# Patient Record
Sex: MALE | Race: WHITE
[De-identification: names, ages, dates, MRNs, and addresses within clinical notes are randomized per-mention and may not be internally consistent; named-entity substitution may affect disease eponyms.]

---

## 2018-09-17 ENCOUNTER — HOSPITAL ENCOUNTER (OUTPATIENT)
Dept: HOSPITAL 53 - M SMT | Age: 68
End: 2018-09-17
Attending: NURSE PRACTITIONER
Payer: MEDICARE

## 2018-09-17 DIAGNOSIS — N21.0: Primary | ICD-10-CM

## 2018-09-17 DIAGNOSIS — N13.30: Primary | ICD-10-CM

## 2018-09-17 DIAGNOSIS — N21.0: ICD-10-CM

## 2018-09-17 DIAGNOSIS — Z12.5: ICD-10-CM

## 2018-09-17 LAB
AMORPH SED URNS QL MICRO: (no result)
ANION GAP: 11 MEQ/L (ref 8–16)
APPEARANCE, URINE: (no result)
BACTERIA UR QL AUTO: NEGATIVE
BILIRUBIN, URINE AUTO: NEGATIVE
BLOOD UREA NITROGEN: 27 MG/DL (ref 7–18)
BLOOD, URINE BLOOD: (no result)
CALCIUM LEVEL: 9.1 MG/DL (ref 8.8–10.2)
CARBON DIOXIDE LEVEL: 25 MEQ/L (ref 21–32)
CHLORIDE LEVEL: 106 MEQ/L (ref 98–107)
CREATININE FOR GFR: 1.36 MG/DL (ref 0.7–1.3)
GFR SERPL CREATININE-BSD FRML MDRD: 55.2 ML/MIN/{1.73_M2} (ref 42–?)
GLUCOSE, FASTING: 94 MG/DL (ref 70–100)
GLUCOSE, URINE (UA) AUTO: NEGATIVE MG/DL
HEMATOCRIT: 38.2 % (ref 42–52)
HEMOGLOBIN: 12.4 G/DL (ref 13.5–17.5)
KETONE, URINE AUTO: NEGATIVE MG/DL
LEUKOCYTE ESTERASE UR QL STRIP.AUTO: NEGATIVE
MEAN CORPUSCULAR HEMOGLOBIN: 30 PG (ref 27–33)
MEAN CORPUSCULAR HGB CONC: 32.5 G/DL (ref 32–36.5)
MEAN CORPUSCULAR VOLUME: 92.5 FL (ref 80–96)
NITRITE, URINE AUTO: NEGATIVE
NRBC BLD AUTO-RTO: 0 % (ref 0–0)
PH,URINE: 5 UNITS (ref 5–9)
PLATELET COUNT, AUTOMATED: 292 10^3/UL (ref 150–450)
POTASSIUM SERUM: 4.6 MEQ/L (ref 3.5–5.1)
PROT UR QL STRIP.AUTO: NEGATIVE MG/DL
PSA SERPL-MCNC: 11.7 NG/ML (ref ?–4)
RBC, URINE AUTO: (no result) /HPF (ref 0–3)
RED BLOOD COUNT: 4.13 10^6/UL (ref 4.3–6.1)
RED CELL DISTRIBUTION WIDTH: 13 % (ref 11.5–14.5)
SODIUM LEVEL: 142 MEQ/L (ref 136–145)
SPECIFIC GRAVITY URINE AUTO: 1.02 (ref 1–1.03)
SQUAMOUS #/AREA URNS AUTO: 0 /HPF (ref 0–6)
WBC, URINE AUTO: 3 /HPF (ref 0–3)
WHITE BLOOD COUNT: 9.4 10^3/UL (ref 4–10)

## 2018-09-17 PROCEDURE — 80048 BASIC METABOLIC PNL TOTAL CA: CPT

## 2018-09-24 ENCOUNTER — HOSPITAL ENCOUNTER (OUTPATIENT)
Dept: HOSPITAL 53 - M RAD | Age: 68
End: 2018-09-24
Attending: NURSE PRACTITIONER
Payer: MEDICARE

## 2018-09-24 DIAGNOSIS — N13.30: Primary | ICD-10-CM

## 2018-09-24 DIAGNOSIS — N21.0: ICD-10-CM

## 2018-09-24 DIAGNOSIS — K80.20: ICD-10-CM

## 2018-09-24 DIAGNOSIS — K76.0: ICD-10-CM

## 2018-09-24 DIAGNOSIS — N28.1: ICD-10-CM

## 2018-09-24 DIAGNOSIS — K57.30: ICD-10-CM

## 2018-11-05 ENCOUNTER — HOSPITAL ENCOUNTER (OUTPATIENT)
Dept: HOSPITAL 53 - M SMT | Age: 68
End: 2018-11-05
Attending: NURSE PRACTITIONER
Payer: MEDICARE

## 2018-11-05 DIAGNOSIS — N21.0: Primary | ICD-10-CM

## 2018-11-05 PROCEDURE — 87088 URINE BACTERIA CULTURE: CPT

## 2018-11-05 PROCEDURE — 87186 SC STD MICRODIL/AGAR DIL: CPT

## 2018-11-11 ENCOUNTER — HOSPITAL ENCOUNTER (EMERGENCY)
Dept: HOSPITAL 53 - M ED | Age: 68
Discharge: HOME | End: 2018-11-11
Payer: MEDICARE

## 2018-11-11 DIAGNOSIS — T83.018A: ICD-10-CM

## 2018-11-11 DIAGNOSIS — N40.0: ICD-10-CM

## 2018-11-11 DIAGNOSIS — R33.9: Primary | ICD-10-CM

## 2018-11-11 DIAGNOSIS — Z87.442: ICD-10-CM

## 2018-11-11 LAB
LEUKOCYTE ESTERASE UR AUTO RFX: (no result)
SPECIFIC GRAVITY UR AUTO RFX: 1.01 (ref 1–1.03)
SQUAM EPITHELIAL CELL UR AURFX: 0 /HPF (ref 0–6)

## 2018-11-11 PROCEDURE — 81001 URINALYSIS AUTO W/SCOPE: CPT

## 2018-11-14 ENCOUNTER — HOSPITAL ENCOUNTER (OUTPATIENT)
Dept: HOSPITAL 53 - M SDC | Age: 68
Discharge: HOME | End: 2018-11-14
Attending: UROLOGY
Payer: MEDICARE

## 2018-11-14 VITALS — WEIGHT: 173.5 LBS | BODY MASS INDEX: 23.5 KG/M2 | HEIGHT: 72 IN

## 2018-11-14 VITALS — DIASTOLIC BLOOD PRESSURE: 64 MMHG | SYSTOLIC BLOOD PRESSURE: 117 MMHG

## 2018-11-14 DIAGNOSIS — R97.20: ICD-10-CM

## 2018-11-14 DIAGNOSIS — N21.9: Primary | ICD-10-CM

## 2018-11-14 DIAGNOSIS — N20.1: ICD-10-CM

## 2018-11-14 PROCEDURE — 76998 US GUIDE INTRAOP: CPT

## 2018-11-14 PROCEDURE — 76872 US TRANSRECTAL: CPT

## 2018-11-14 PROCEDURE — 74420 UROGRAPHY RTRGR +-KUB: CPT

## 2018-11-14 PROCEDURE — 55700: CPT

## 2018-11-14 PROCEDURE — 52318 REMOVE BLADDER STONE: CPT

## 2018-11-14 PROCEDURE — 82360 CALCULUS ASSAY QUANT: CPT

## 2018-11-14 PROCEDURE — 88300 SURGICAL PATH GROSS: CPT

## 2018-11-14 NOTE — REP
Prostate sonography:

 

History: Elevated PSA

 

Sonographic findings:  Trans rectal prostate sonography demonstrates unremarkable

seminal vesicles.  Prostate gland is heterogeneously enlarged with calcifications

and cystic changes noted.  Glandular dimensions are measured at 7.0 x 5.5 x 4.2

cm with a calculated glandular volume of 105.5 ml.

 

Transrectal sonographic guidance provided to Dr. Mcconnell who performed trans

rectal ultrasound guided needle biopsy procedure .

 

 

Electronically Signed by

Buck Velasquez MD 11/14/2018 01:24 P

## 2018-11-14 NOTE — RO
DATE OF PROCEDURE:  11/14/2018

 

PREOPERATIVE DIAGNOSIS:  Bladder stones, left ureteral stone, elevated prostate

specific antigen.

 

POSTOPERATIVE DIAGNOSIS:  Bladder stones, left ureteral stone, elevated prostate

specific antigen.

 

OPERATIVE PROCEDURE:  Cystoscopy, cystolitholapaxy, left ureteroscopy, left

retrograde pyelogram with intraoperative interpretation of images, transrectal

ultrasound guided prostate biopsy.

 

SURGEON:  Pedro Mcconnell MD

 

ASSISTANT:  None.

 

ANESTHESIA:  General.

 

OPERATIVE INDICATIONS:

This is a 68-year-old male who was found on recent CT scan to have several large

bladder stones as well as an obstructing distal left ureteral stone. He was also

found to have an elevated prostate specific antigen of 11.7. He was brought to

the operating room today for the above listed procedures.

 

DESCRIPTION OF OPERATION:

The patient was brought to the operating room where general anesthesia was

induced. Prophylactic antibiotics were infused. He was then placed in the dorsal

lithotomy position and prepped and draped in the usual sterile fashion. A rigid

cystoscope was inserted through the meatus and advanced to the bladder. Of note,

the patient had trilobar benign prostatic hyperplasia with a prominent median

lobe. He had at least three to four large bladder stones. At this point, the

cystoscope was traded off for a nephroscope and then that was inserted into the

bladder. I then utilized the [employer]Wand to fragment all the bladder stones into

smaller pieces and suction up the pieces. Any remaining pieces left in the

bladder, I removed these with an Cloud4Wi evacuator. Once all the bladder stone

fragments were removed, I then advanced a guidewire up the left collecting

system. I then went up the left collecting system with a short semi-rigid

ureteroscope and within the distal to mid ureter no stones were seen. I then

examined the proximal ureter as well as the left kidney thoroughly and no stones

were seen there either indicating that the stone had passed. A retrograde

pyelogram was performed and was notable for mild hydronephrosis with no

extravasation. I then withdrew the ureteroscope and removed the wire.

 

At this point I turned my attention to doing the prostate biopsy. The patient was

then undraped and repositioned on his side in preparation for prostate biopsy. At

this point the ultrasound technician took several measurements of the prostate

and he had a volume of approximately 105 mL. We then took 12 biopsies of the

prostate, six on the right side and six on the left. We obtained biopsies from

the apex, apex lateral, mid, mid lateral, base, and base lateral on both sides

for a total of 12 biopsies. Once the biopsy was done, the patient was then placed

back in supine position and under sterile conditions, he then had an #18-Swiss

Jimenez catheter placed into the bladder. The balloon was filled with 10 mL of

sterile water and then a catheter was connected to gravity drainage. This marked

the conclusion of the procedure. The patient was then awakened from anesthesia

and transported to the recovery room in stable condition.

 

ESTIMATED BLOOD LOSS: 10 mL.

 

COMPLICATIONS: None.

 

SPECIMENS: Bladder stone fragments, prostate biopsies.

 

PLAN:

The patient will followup in the clinic in approximately 2 weeks to discuss his

biopsy results. We will also at that time determine what we will do about his

urinary retention. If the prostate biopsy is positive for cancer, then I would

recommend a prostatectomy to treat the cancer as well as get him out of

retention. If his prostate biopsy is negative I would recommend doing a button

transurethral electrovaporization of the prostate to help with the retention.

## 2018-11-14 NOTE — REP
RETROGRADE PYELOGRAM:  SINGLE VIEW.

 

HISTORY:  Bladder stone.

 

17 seconds of fluoroscopy time is reported.

 

FINDINGS:  A single last image hold fluoroscopically obtained intraprocedural

spot radiograph of the abdomen documents ureteral cannulation, contrast

injection.  There are no laterality markers visible.

 

 

Electronically Signed by

Buck Velasquez MD 11/14/2018 06:30 P

## 2018-11-22 LAB
AMM URATE MFR STONE: (no result) %
CA H2 PHOS DIHYD MFR STONE: (no result) %
CALCIUM OXALATE DIHYDRATE MFR STONE IR: (no result) %
CELL MATERIAL STONE EST-MCNT: (no result) %
CHOLEST SERPL-MCNC: (no result) MG/DL
COM MFR STONE: 13 %
NA URATE MFR STONE IR: (no result) %
STONE ANALYSIS-IMP: (no result)
URATE DIHYD MFR STONE: (no result) %
URATE SERPL-MCNC: 85 %

## 2018-12-26 ENCOUNTER — HOSPITAL ENCOUNTER (OUTPATIENT)
Dept: HOSPITAL 53 - M SMT | Age: 68
End: 2018-12-26
Attending: UROLOGY
Payer: MEDICARE

## 2018-12-26 DIAGNOSIS — N28.89: ICD-10-CM

## 2018-12-26 DIAGNOSIS — Z01.818: Primary | ICD-10-CM

## 2018-12-26 LAB
APPEARANCE UR: (no result)
BACTERIA UR QL AUTO: (no result)
BILIRUB UR QL STRIP.AUTO: NEGATIVE
GLUCOSE UR QL STRIP.AUTO: NEGATIVE MG/DL
HGB UR QL STRIP.AUTO: (no result)
KETONES UR QL STRIP.AUTO: NEGATIVE MG/DL
LEUKOCYTE ESTERASE UR QL STRIP.AUTO: (no result)
MUCOUS THREADS URNS QL MICRO: (no result)
NITRITE UR QL STRIP.AUTO: NEGATIVE
PH UR STRIP.AUTO: 5 UNITS (ref 5–9)
PROT UR QL STRIP.AUTO: (no result) MG/DL
RBC # UR AUTO: (no result) /HPF (ref 0–3)
SP GR UR STRIP.AUTO: 1.02 (ref 1–1.03)
SQUAMOUS #/AREA URNS AUTO: 0 /HPF (ref 0–6)
UROBILINOGEN UR QL STRIP.AUTO: 0.2 MG/DL (ref 0–2)
WBC #/AREA URNS AUTO: 125 /HPF (ref 0–3)

## 2019-01-09 ENCOUNTER — HOSPITAL ENCOUNTER (OUTPATIENT)
Dept: HOSPITAL 53 - M SDC | Age: 69
Discharge: HOME | End: 2019-01-09
Attending: UROLOGY
Payer: MEDICARE

## 2019-01-09 VITALS — HEIGHT: 72 IN | WEIGHT: 176 LBS | BODY MASS INDEX: 23.84 KG/M2

## 2019-01-09 VITALS — SYSTOLIC BLOOD PRESSURE: 124 MMHG | DIASTOLIC BLOOD PRESSURE: 56 MMHG

## 2019-01-09 DIAGNOSIS — N40.1: Primary | ICD-10-CM

## 2019-01-09 DIAGNOSIS — Z79.899: ICD-10-CM

## 2019-01-09 PROCEDURE — 52601 PROSTATECTOMY (TURP): CPT

## 2019-01-09 NOTE — RO
DATE OF PROCEDURE:  01/09/2019

 

PREPROCEDURE DIAGNOSIS:  Benign prostatic hyperplasia.

 

POSTPROCEDURE DIAGNOSIS: Benign prostatic hyperplasia.

 

PROCEDURE:  Cystoscopy, Button transurethral electrovaporization of the

prostate.

 

SURGEON:  Pedro Mcconnell MD

 

ASSISTANT:  None.

 

ANESTHESIA:  General.

 

OPERATIVE INDICATIONS: This is a 68-year-old male with benign prostatic

hyperplasia with urinary retention. He is brought to the operating room today for

the above listed procedure.

 

DESCRIPTION OF PROCEDURE:  The patient was brought to the operating room and

general anesthesia was induced. Prophylactic antibiotics were infused. He was

then placed in the dorsal lithotomy position and prepped and draped in the usual

sterile fashion. At this point a resectoscope was inserted through the urethral

meatus and advanced into the bladder using a visual obturator. Once inside the

bladder was thoroughly examined and of note both ureteral orifices were not close

to the bladder neck. I also made note of the location of verumontanum. The

patient had an extremely large median lobe growing in toward the bladder as well.

At this point, I used the Button to start vaporizing hyperplastic tissue on the

median lobe and then circumferentially at the bladder neck. I then started

vaporizing tissue in both lateral lobes. I kept doing this until there was a

clear channel established. Throughout the procedure, I made sure not to vaporize

too close to the ureteral orifice or distal to the verumontanum. Once satisfied

there was a clear channel hemostasis was obtained. Once there was good hemostasis

the resectoscope was removed and an #18-[place] Jimenez catheter was inserted into

the bladder. The balloon was then filled with 15 mL of sterile water and fluid

drained clear at the end of the procedure.

 

The catheter was connected to gravity drainage and this marked the conclusion of

the procedure. The patient was taken out of dorsal lithotomy position, awakened

from anesthesia and transported to the recovery room in stable condition.

 

ESTIMATED BLOOD LOSS: 40 mL

 

INTRAOPERATIVE COMPLICATIONS: None

 

SPECIMENS: None

 

PLAN: The patient will followup in clinic in approximately 1 week for catheter

removal and voiding trial.

## 2019-11-30 ENCOUNTER — HOSPITAL ENCOUNTER (OUTPATIENT)
Dept: HOSPITAL 53 - M WUC | Age: 69
End: 2019-11-30
Attending: PHYSICIAN ASSISTANT
Payer: MEDICARE

## 2019-11-30 DIAGNOSIS — M70.31: Primary | ICD-10-CM

## 2019-11-30 NOTE — REP
Clinical:  Contusion.

 

Technique:  AP, lateral, bilateral oblique views of the right elbow.

 

Findings:

No obvious acute fracture or dislocation.  Anterior and posterior fat pads are in

normal position.  Lateral view demonstrates posterior swelling with curvilinear

calcification suggesting acute/chronic bursitis.

 

Impression:

1.  No acute fracture or dislocation.

2.  Acute / chronic bursitis.

 

 

Electronically Signed by

Grant Ramsay MD 11/30/2019 03:50 P

## 2022-07-05 ENCOUNTER — HOSPITAL ENCOUNTER (EMERGENCY)
Dept: HOSPITAL 53 - M ED | Age: 72
LOS: 1 days | Discharge: LEFT BEFORE DISCHARGE | End: 2022-07-06
Payer: MEDICARE

## 2022-07-05 VITALS — WEIGHT: 169.32 LBS | HEIGHT: 72 IN | BODY MASS INDEX: 22.93 KG/M2

## 2022-07-05 VITALS — DIASTOLIC BLOOD PRESSURE: 82 MMHG | SYSTOLIC BLOOD PRESSURE: 130 MMHG

## 2022-07-05 DIAGNOSIS — Z53.29: Primary | ICD-10-CM

## 2022-10-22 ENCOUNTER — HOSPITAL ENCOUNTER (OUTPATIENT)
Dept: HOSPITAL 53 - M LAB | Age: 72
End: 2022-10-22
Attending: INTERNAL MEDICINE
Payer: MEDICARE

## 2022-10-22 DIAGNOSIS — I50.42: ICD-10-CM

## 2022-10-22 DIAGNOSIS — E78.5: Primary | ICD-10-CM

## 2022-10-22 LAB
ALBUMIN SERPL BCG-MCNC: 3.5 GM/DL (ref 3.2–5.2)
BUN SERPL-MCNC: 30 MG/DL (ref 7–18)
CALCIUM SERPL-MCNC: 8.9 MG/DL (ref 8.8–10.2)
CHLORIDE SERPL-SCNC: 110 MEQ/L (ref 98–107)
CHOLEST SERPL-MCNC: 187 MG/DL (ref ?–200)
CHOLEST/HDLC SERPL: 4.92 {RATIO} (ref ?–5)
CO2 SERPL-SCNC: 25 MEQ/L (ref 21–32)
CREAT SERPL-MCNC: 1.44 MG/DL (ref 0.7–1.3)
GFR SERPL CREATININE-BSD FRML MDRD: 51.1 ML/MIN/{1.73_M2} (ref 42–?)
GLUCOSE SERPL-MCNC: 122 MG/DL (ref 70–100)
HDLC SERPL-MCNC: 38 MG/DL (ref 40–?)
LDLC SERPL CALC-MCNC: 129 MG/DL (ref ?–100)
NONHDLC SERPL-MCNC: 149 MG/DL
NT-PRO BNP: 2791 PG/ML (ref ?–125)
PHOSPHATE SERPL-MCNC: 2.9 MG/DL (ref 2.5–4.9)
POTASSIUM SERPL-SCNC: 4.2 MEQ/L (ref 3.5–5.1)
SODIUM SERPL-SCNC: 141 MEQ/L (ref 136–145)
TRIGL SERPL-MCNC: 100 MG/DL (ref ?–150)

## 2022-12-03 ENCOUNTER — HOSPITAL ENCOUNTER (OUTPATIENT)
Dept: HOSPITAL 53 - M LAB | Age: 72
End: 2022-12-03
Attending: FAMILY MEDICINE
Payer: MEDICARE

## 2022-12-03 DIAGNOSIS — I50.42: Primary | ICD-10-CM

## 2022-12-03 LAB
BUN SERPL-MCNC: 39 MG/DL (ref 9–23)
CALCIUM SERPL-MCNC: 8.9 MG/DL (ref 8.3–10.6)
CHLORIDE SERPL-SCNC: 107 MMOL/L (ref 98–107)
CO2 SERPL-SCNC: 26 MMOL/L (ref 20–31)
CREAT SERPL-MCNC: 1.41 MG/DL (ref 0.7–1.3)
GFR SERPL CREATININE-BSD FRML MDRD: 52.3 ML/MIN/{1.73_M2} (ref 42–?)
GLUCOSE SERPL-MCNC: 206 MG/DL (ref 74–106)
POTASSIUM SERPL-SCNC: 4.3 MMOL/L (ref 3.5–5.1)
SODIUM SERPL-SCNC: 141 MMOL/L (ref 136–145)

## 2023-01-03 ENCOUNTER — HOSPITAL ENCOUNTER (OUTPATIENT)
Dept: HOSPITAL 53 - M LAB | Age: 73
End: 2023-01-03
Attending: INTERNAL MEDICINE
Payer: MEDICARE

## 2023-01-03 DIAGNOSIS — R94.31: Primary | ICD-10-CM

## 2023-01-03 DIAGNOSIS — I50.22: ICD-10-CM

## 2023-01-03 DIAGNOSIS — I35.1: ICD-10-CM

## 2023-01-03 LAB
ALBUMIN SERPL BCG-MCNC: 3.4 G/DL (ref 3.2–5.2)
ALP SERPL-CCNC: 723 U/L (ref 46–116)
ALT SERPL W P-5'-P-CCNC: 124 U/L (ref 7–40)
AST SERPL-CCNC: 116 U/L (ref ?–34)
BASOPHILS # BLD AUTO: 0.1 10^3/UL (ref 0–0.2)
BASOPHILS NFR BLD AUTO: 0.8 % (ref 0–1)
BILIRUB SERPL-MCNC: 0.7 MG/DL (ref 0.3–1.2)
BUN SERPL-MCNC: 51 MG/DL (ref 9–23)
CALCIUM SERPL-MCNC: 8.8 MG/DL (ref 8.3–10.6)
CHLORIDE SERPL-SCNC: 105 MMOL/L (ref 98–107)
CO2 SERPL-SCNC: 22 MMOL/L (ref 20–31)
CREAT SERPL-MCNC: 1.92 MG/DL (ref 0.7–1.3)
EOSINOPHIL # BLD AUTO: 0.6 10^3/UL (ref 0–0.5)
EOSINOPHIL NFR BLD AUTO: 7.4 % (ref 0–3)
FERRITIN SERPL-MCNC: 375.5 NG/ML (ref 10.5–307.3)
GFR SERPL CREATININE-BSD FRML MDRD: 36.6 ML/MIN/{1.73_M2} (ref 42–?)
GLUCOSE SERPL-MCNC: 88 MG/DL (ref 74–106)
HCT VFR BLD AUTO: 40.1 % (ref 42–52)
HGB BLD-MCNC: 12.4 G/DL (ref 13.5–17.5)
IRON SATN MFR SERPL: 19.3 % (ref 19.7–50)
IRON SERPL-MCNC: 55 UG/DL (ref 65–175)
LYMPHOCYTES # BLD AUTO: 1.4 10^3/UL (ref 1.5–5)
LYMPHOCYTES NFR BLD AUTO: 18.2 % (ref 24–44)
MCH RBC QN AUTO: 30.4 PG (ref 27–33)
MCHC RBC AUTO-ENTMCNC: 30.9 G/DL (ref 32–36.5)
MCV RBC AUTO: 98.3 FL (ref 80–96)
MONOCYTES # BLD AUTO: 0.7 10^3/UL (ref 0–0.8)
MONOCYTES NFR BLD AUTO: 9 % (ref 2–8)
NEUTROPHILS # BLD AUTO: 4.9 10^3/UL (ref 1.5–8.5)
NEUTROPHILS NFR BLD AUTO: 64.3 % (ref 36–66)
PLATELET # BLD AUTO: 209 10^3/UL (ref 150–450)
POTASSIUM SERPL-SCNC: 4.4 MMOL/L (ref 3.5–5.1)
PROT SERPL-MCNC: 6.8 G/DL (ref 5.7–8.2)
RBC # BLD AUTO: 4.08 10^6/UL (ref 4.3–6.1)
SODIUM SERPL-SCNC: 141 MMOL/L (ref 136–145)
TIBC SERPL-MCNC: 285 UG/DL (ref 250–425)
WBC # BLD AUTO: 7.6 10^3/UL (ref 4–10)

## 2023-02-04 ENCOUNTER — HOSPITAL ENCOUNTER (OUTPATIENT)
Dept: HOSPITAL 53 - M LAB | Age: 73
End: 2023-02-04
Attending: INTERNAL MEDICINE
Payer: MEDICARE

## 2023-02-04 ENCOUNTER — HOSPITAL ENCOUNTER (OUTPATIENT)
Dept: HOSPITAL 53 - M LAB | Age: 73
End: 2023-02-04
Attending: FAMILY MEDICINE
Payer: MEDICARE

## 2023-02-04 DIAGNOSIS — I50.42: Primary | ICD-10-CM

## 2023-02-04 DIAGNOSIS — R74.01: Primary | ICD-10-CM

## 2023-02-04 LAB
ALBUMIN SERPL BCG-MCNC: 3.5 G/DL (ref 3.2–5.2)
ALP SERPL-CCNC: 208 U/L (ref 46–116)
ALT SERPL W P-5'-P-CCNC: 28 U/L (ref 7–40)
AST SERPL-CCNC: 30 U/L (ref ?–34)
BILIRUB CONJ SERPL-MCNC: 0.3 MG/DL (ref ?–0.4)
BILIRUB SERPL-MCNC: 1 MG/DL (ref 0.3–1.2)
BUN SERPL-MCNC: 69 MG/DL (ref 9–23)
CALCIUM SERPL-MCNC: 9.1 MG/DL (ref 8.3–10.6)
CHLORIDE SERPL-SCNC: 109 MMOL/L (ref 98–107)
CO2 SERPL-SCNC: 21 MMOL/L (ref 20–31)
CREAT SERPL-MCNC: 1.58 MG/DL (ref 0.7–1.3)
FERRITIN SERPL-MCNC: 442.1 NG/ML (ref 10.5–307.3)
GFR SERPL CREATININE-BSD FRML MDRD: 45.9 ML/MIN/{1.73_M2} (ref 42–?)
GLUCOSE SERPL-MCNC: 101 MG/DL (ref 74–106)
HBV CORE IGM SER QL: NEGATIVE
HBV SURFACE AB SER-ACNC: NEGATIVE M[IU]/ML
HCV AB SER QL: < 0 INDEX (ref ?–0.8)
IRON SATN MFR SERPL: 32.3 % (ref 19.7–50)
IRON SERPL-MCNC: 97 UG/DL (ref 65–175)
POTASSIUM SERPL-SCNC: 5.4 MMOL/L (ref 3.5–5.1)
PROT SERPL-MCNC: 7.2 G/DL (ref 5.7–8.2)
SODIUM SERPL-SCNC: 140 MMOL/L (ref 136–145)
TIBC SERPL-MCNC: 300 UG/DL (ref 250–425)

## 2023-02-14 ENCOUNTER — HOSPITAL ENCOUNTER (OUTPATIENT)
Dept: HOSPITAL 53 - M LAB | Age: 73
End: 2023-02-14
Attending: INTERNAL MEDICINE
Payer: MEDICARE

## 2023-02-14 DIAGNOSIS — E87.5: Primary | ICD-10-CM

## 2023-02-14 LAB
BUN SERPL-MCNC: 65 MG/DL (ref 9–23)
CALCIUM SERPL-MCNC: 8.4 MG/DL (ref 8.3–10.6)
CHLORIDE SERPL-SCNC: 109 MMOL/L (ref 98–107)
CO2 SERPL-SCNC: 23 MMOL/L (ref 20–31)
CREAT SERPL-MCNC: 2.19 MG/DL (ref 0.7–1.3)
GFR SERPL CREATININE-BSD FRML MDRD: 31.5 ML/MIN/{1.73_M2} (ref 42–?)
GLUCOSE SERPL-MCNC: 102 MG/DL (ref 74–106)
POTASSIUM SERPL-SCNC: 5.2 MMOL/L (ref 3.5–5.1)
SODIUM SERPL-SCNC: 138 MMOL/L (ref 136–145)

## 2023-04-10 ENCOUNTER — HOSPITAL ENCOUNTER (OUTPATIENT)
Dept: HOSPITAL 53 - M WUC | Age: 73
End: 2023-04-10
Attending: NURSE PRACTITIONER
Payer: MEDICARE

## 2023-04-10 DIAGNOSIS — M79.672: Primary | ICD-10-CM

## 2025-07-30 ENCOUNTER — HOSPITAL ENCOUNTER (OUTPATIENT)
Dept: HOSPITAL 53 - M PLALAB | Age: 75
End: 2025-07-30
Attending: FAMILY MEDICINE
Payer: MEDICARE

## 2025-07-30 DIAGNOSIS — Z79.899: ICD-10-CM

## 2025-07-30 DIAGNOSIS — N40.1: ICD-10-CM

## 2025-07-30 DIAGNOSIS — Z00.00: Primary | ICD-10-CM

## 2025-07-30 DIAGNOSIS — Z12.5: ICD-10-CM

## 2025-07-30 LAB
ALBUMIN SERPL BCG-MCNC: 3.4 G/DL (ref 3.2–5.2)
ALP SERPL-CCNC: 75 U/L (ref 40–129)
ALT SERPL W P-5'-P-CCNC: 15 U/L (ref 7–40)
AST SERPL-CCNC: 17 U/L (ref ?–34)
BASOPHILS # BLD AUTO: 0.1 10^3/UL (ref 0–0.2)
BASOPHILS NFR BLD AUTO: 0.6 % (ref 0–1)
BILIRUB SERPL-MCNC: 0.7 MG/DL (ref 0.3–1.2)
BUN SERPL-MCNC: 34 MG/DL (ref 9–23)
CALCIUM SERPL-MCNC: 8.8 MG/DL (ref 8.3–10.6)
CHLORIDE SERPL-SCNC: 109 MMOL/L (ref 98–107)
CHOLEST SERPL-MCNC: 175 MG/DL (ref ?–200)
CHOLEST/HDLC SERPL: 5.1 {RATIO} (ref ?–5)
CO2 SERPL-SCNC: 24 MMOL/L (ref 20–31)
CREAT SERPL-MCNC: 1.81 MG/DL (ref 0.7–1.3)
EOSINOPHIL # BLD AUTO: 0.3 10^3/UL (ref 0–0.5)
EOSINOPHIL NFR BLD AUTO: 3.5 % (ref 0–3)
GFR SERPL CREATININE-BSD FRML MDRD: 38 ML/MIN/{1.73_M2} (ref 42–?)
GLUCOSE SERPL-MCNC: 88 MG/DL (ref 74–106)
HCT VFR BLD AUTO: 36.5 % (ref 42–52)
HDLC SERPL-MCNC: 34.3 MG/DL (ref 40–?)
HGB BLD-MCNC: 11.4 G/DL (ref 13.5–17.5)
LDLC SERPL CALC-MCNC: 122.1 MG/DL (ref ?–100)
LYMPHOCYTES # BLD AUTO: 2.2 10^3/UL (ref 1.5–5)
LYMPHOCYTES NFR BLD AUTO: 25.7 % (ref 24–44)
MCH RBC QN AUTO: 30.2 PG (ref 27–33)
MCHC RBC AUTO-ENTMCNC: 31.2 G/DL (ref 32–36.5)
MCV RBC AUTO: 96.8 FL (ref 80–96)
MONOCYTES # BLD AUTO: 0.8 10^3/UL (ref 0–0.8)
MONOCYTES NFR BLD AUTO: 9.8 % (ref 2–8)
NEUTROPHILS # BLD AUTO: 5.1 10^3/UL (ref 1.5–8.5)
NEUTROPHILS NFR BLD AUTO: 60.2 % (ref 36–66)
NONHDLC SERPL-MCNC: 140.7 MG/DL
PLATELET # BLD AUTO: 244 10^3/UL (ref 150–450)
POTASSIUM SERPL-SCNC: 4.8 MMOL/L (ref 3.5–5.1)
PROT SERPL-MCNC: 6.7 G/DL (ref 5.7–8.2)
PSA SERPL-MCNC: 7.88 NG/ML (ref ?–4)
RBC # BLD AUTO: 3.77 10^6/UL (ref 4.3–6.1)
SODIUM SERPL-SCNC: 146 MMOL/L (ref 136–145)
TRIGL SERPL-MCNC: 93 MG/DL (ref ?–150)
WBC # BLD AUTO: 8.4 10^3/UL (ref 4–10)

## 2025-07-30 PROCEDURE — 80061 LIPID PANEL: CPT

## 2025-07-30 PROCEDURE — 80053 COMPREHEN METABOLIC PANEL: CPT

## 2025-07-30 PROCEDURE — 36415 COLL VENOUS BLD VENIPUNCTURE: CPT

## 2025-07-30 PROCEDURE — 85025 COMPLETE CBC W/AUTO DIFF WBC: CPT
